# Patient Record
Sex: FEMALE | Race: WHITE | NOT HISPANIC OR LATINO | Employment: UNEMPLOYED | URBAN - METROPOLITAN AREA
[De-identification: names, ages, dates, MRNs, and addresses within clinical notes are randomized per-mention and may not be internally consistent; named-entity substitution may affect disease eponyms.]

---

## 2024-01-14 DIAGNOSIS — E06.3 HASHIMOTO'S THYROIDITIS: Primary | ICD-10-CM

## 2024-01-15 PROBLEM — E06.3 HASHIMOTO'S THYROIDITIS: Status: ACTIVE | Noted: 2024-01-15

## 2024-01-15 RX ORDER — LEVOTHYROXINE SODIUM 137 UG/1
137 TABLET ORAL DAILY
Qty: 30 TABLET | Refills: 0 | Status: SHIPPED | OUTPATIENT
Start: 2024-01-15 | End: 2024-02-16 | Stop reason: SDUPTHER

## 2024-01-18 ENCOUNTER — APPOINTMENT (OUTPATIENT)
Dept: ENDOCRINOLOGY | Facility: CLINIC | Age: 34
End: 2024-01-18
Payer: COMMERCIAL

## 2024-01-24 ENCOUNTER — TELEMEDICINE (OUTPATIENT)
Dept: ENDOCRINOLOGY | Facility: CLINIC | Age: 34
End: 2024-01-24
Payer: COMMERCIAL

## 2024-01-24 DIAGNOSIS — E31.0: ICD-10-CM

## 2024-01-24 DIAGNOSIS — E53.8 VITAMIN B12 DEFICIENCY: ICD-10-CM

## 2024-01-24 DIAGNOSIS — E28.319 PREMATURE MENOPAUSE: ICD-10-CM

## 2024-01-24 DIAGNOSIS — E06.3 HYPOTHYROIDISM DUE TO HASHIMOTO'S THYROIDITIS: ICD-10-CM

## 2024-01-24 DIAGNOSIS — E27.40 ADRENAL INSUFFICIENCY (MULTI): Primary | ICD-10-CM

## 2024-01-24 DIAGNOSIS — E55.9 VITAMIN D DEFICIENCY: ICD-10-CM

## 2024-01-24 DIAGNOSIS — E03.8 HYPOTHYROIDISM DUE TO HASHIMOTO'S THYROIDITIS: ICD-10-CM

## 2024-01-24 PROCEDURE — 99215 OFFICE O/P EST HI 40 MIN: CPT | Performed by: INTERNAL MEDICINE

## 2024-01-24 RX ORDER — HYDROCORTISONE SODIUM SUCCINATE 100 MG/2ML
100 INJECTION, POWDER, FOR SOLUTION INTRAMUSCULAR; INTRAVENOUS AS NEEDED
COMMUNITY
Start: 2021-05-04

## 2024-01-24 RX ORDER — CHOLECALCIFEROL (VITAMIN D3) 25 MCG
1 TABLET ORAL DAILY
COMMUNITY
Start: 2021-03-03

## 2024-01-24 RX ORDER — ESTRADIOL 1 MG/1
1 TABLET ORAL DAILY
COMMUNITY
Start: 2022-04-08

## 2024-01-24 RX ORDER — PREDNISONE 5 MG/1
5 TABLET ORAL
COMMUNITY
Start: 2022-11-23 | End: 2024-06-05 | Stop reason: SDUPTHER

## 2024-01-24 RX ORDER — FLUDROCORTISONE ACETATE 0.1 MG/1
0.1 TABLET ORAL DAILY
COMMUNITY
End: 2024-06-05 | Stop reason: SDUPTHER

## 2024-01-24 RX ORDER — EPINEPHRINE 0.3 MG/.3ML
0.3 INJECTION SUBCUTANEOUS ONCE AS NEEDED
COMMUNITY
Start: 2021-01-12

## 2024-01-24 RX ORDER — ALBUTEROL SULFATE 90 UG/1
2 AEROSOL, METERED RESPIRATORY (INHALATION) EVERY 6 HOURS PRN
COMMUNITY

## 2024-01-24 NOTE — PROGRESS NOTES
Consults    Reason For Consult  Polyglandular disease     History Of Present Illness  Linh Zurita is a 33 y.o. 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     Follow up visit for Hashimoto's thyroiditis, Schurz's disease, primary ovarian failure.    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     She is on multiple hormone replacement   For thyroid  LT4 137 mcg daily (which she takes HS)  -continue Vit D 1000 IU daily  -primary ovarian failure was managed by reproductive endocrinology but she had issues with supplements , and stopped     unable to tolerate OCP   -DEXA por recently fractured ankle reviewed in the system        Pt has been to ED for covid infection and her virginie's , in 9/2023 .  No adrenal crisis     Meds:  -prednisone 5 mg daily   -LT4 137mcg   -FC 0.1mg BID       Pt unable to take HC- Also having issues obtaining [employer] brand. States [employer] variety currently supplied by her pharmacy makes her feel bad.           Any family history of thyroid cancer? no  Any personal history of radiation to your head/neck? no    Past Medical History  She has a past medical history of Schurz's disease (CMS/HCC), Hashimoto's thyroiditis, Personal history of other endocrine, nutritional and metabolic disease (08/29/2017), and Vitamin D deficiency.    Surgical History  She has a past surgical history that includes Other surgical history (08/29/2017).     Social History  She reports that she has quit smoking. Her smoking use included cigarettes. She has a 1.00 pack-year smoking history. She has never used smokeless tobacco. She reports current alcohol use of about 2.0 standard drinks of alcohol per week. She reports that she does not use drugs.    Family History  Family History   Problem Relation Name Age of Onset    Coronary artery disease  Mother      COPD Mother      Coronary artery disease Father      Stroke Father      Clotting disorder Father      Thyroid disease Sister Archie     Crohn's disease Sister Archie     Diabetes Paternal Grandmother AnnaMazac         Allergies  Patient has no known allergies.    Review of Systems    Past Medical History:   Diagnosis Date    [place]'s disease (CMS/HCC)     Hashimoto's thyroiditis     Personal history of other endocrine, nutritional and metabolic disease 08/29/2017    History of hypothyroidism    Vitamin D deficiency        Past Surgical History:   Procedure Laterality Date    OTHER SURGICAL HISTORY  08/29/2017    Oral Surgery       Social History     Socioeconomic History    Marital status:      Spouse name: Not on file    Number of children: Not on file    Years of education: Not on file    Highest education level: Not on file   Occupational History    Not on file   Tobacco Use    Smoking status: Former     Packs/day: 0.50     Years: 2.00     Additional pack years: 0.00     Total pack years: 1.00     Types: Cigarettes    Smokeless tobacco: Never   Substance and Sexual Activity    Alcohol use: Yes     Alcohol/week: 2.0 standard drinks of alcohol     Types: 2 Cans of beer per week    Drug use: Never    Sexual activity: Yes     Partners: Male   Other Topics Concern    Not on file   Social History Narrative    Not on file     Social Determinants of Health     Financial Resource Strain: Not on file   Food Insecurity: Not on file   Transportation Needs: Not on file   Physical Activity: Not on file   Stress: Not on file   Social Connections: Not on file   Intimate Partner Violence: Not on file   Housing Stability: Not on file        Physical Exam     ROS, PMH, FH/SH, surgical history and allergies have been reviewed.    Last Recorded Vitals  There were no vitals taken for this visit.    Relevant Results         If you would like to pull in Medications, type .meds     If you would like to pull in Lab results for  "the last 24 hours, type .[id number]    If you would like to pull in specific Lab results, type .ll     If you would like to pull in Imaging results, type .imgrslt :99}  Lab Review  Lab Results   Component Value Date    BILITOT 0.6 11/23/2022    CALCIUM 9.6 11/23/2022    CO2 25 11/23/2022     11/23/2022    CREATININE 0.79 11/23/2022    GLUCOSE 82 11/23/2022    ALKPHOS 60 11/23/2022    K 4.1 11/23/2022    PROT 7.2 11/23/2022     11/23/2022    AST 31 11/23/2022    ALT 38 11/23/2022    BUN 9 11/23/2022    ANIONGAP 16 11/23/2022    PHOS 4.9 11/23/2022    ALBUMIN 4.4 11/23/2022    GFRF >90 11/23/2022     No results found for: \"TRIG\", \"CHOL\", \"LDLCALC\", \"HDL\"  Lab Results   Component Value Date    HGBA1C 5.0 09/07/2021     The ASCVD Risk score (Saadia HALL, et al., 2019) failed to calculate for the following reasons:    The 2019 ASCVD risk score is only valid for ages 40 to 79       Assessment/Plan   Problem List Items Addressed This Visit    None  Visit Diagnoses         Codes    Adrenal insufficiency (CMS/HCC)    -  Primary E27.40    Relevant Orders    Adrenocorticotropic Hormone (ACTH)    Cortisol    DHEA-Sulfate    FSH & LH    CBC    Vitamin D 25-Hydroxy,Total (for eval of Vitamin D levels)    Parathyroid Hormone, Intact    Magnesium    Comprehensive Metabolic Panel    Vitamin B12    Thyroxine, Free    Thyroid Stimulating Hormone    Iron and TIBC    Ferritin    Lipid Panel    Hemoglobin A1C    Hypothyroidism due to Hashimoto's thyroiditis     E03.8, E06.3    Relevant Orders    Adrenocorticotropic Hormone (ACTH)    Cortisol    DHEA-Sulfate    FSH & LH    CBC    Vitamin D 25-Hydroxy,Total (for eval of Vitamin D levels)    Parathyroid Hormone, Intact    Magnesium    Comprehensive Metabolic Panel    Vitamin B12    Thyroxine, Free    Thyroid Stimulating Hormone    Iron and TIBC    Ferritin    Lipid Panel    Hemoglobin A1C    Polyglandular autoimmune deficiency (CMS/HCC)     E31.0    Relevant Orders    " Adrenocorticotropic Hormone (ACTH)    Cortisol    DHEA-Sulfate    FSH & LH    CBC    Vitamin D 25-Hydroxy,Total (for eval of Vitamin D levels)    Parathyroid Hormone, Intact    Magnesium    Comprehensive Metabolic Panel    Vitamin B12    Thyroxine, Free    Thyroid Stimulating Hormone    Iron and TIBC    Ferritin    Lipid Panel    Hemoglobin A1C    Vitamin D deficiency     E55.9    Relevant Orders    Adrenocorticotropic Hormone (ACTH)    Cortisol    DHEA-Sulfate    FSH & LH    CBC    Vitamin D 25-Hydroxy,Total (for eval of Vitamin D levels)    Parathyroid Hormone, Intact    Magnesium    Comprehensive Metabolic Panel    Vitamin B12    Thyroxine, Free    Thyroid Stimulating Hormone    Iron and TIBC    Ferritin    Lipid Panel    Hemoglobin A1C    Vitamin B12 deficiency     E53.8    Relevant Orders    Adrenocorticotropic Hormone (ACTH)    Cortisol    DHEA-Sulfate    FSH & LH    CBC    Vitamin D 25-Hydroxy,Total (for eval of Vitamin D levels)    Parathyroid Hormone, Intact    Magnesium    Comprehensive Metabolic Panel    Vitamin B12    Thyroxine, Free    Thyroid Stimulating Hormone    Iron and TIBC    Ferritin    Lipid Panel    Hemoglobin A1C    Premature menopause     E28.319    Relevant Orders    Referral to Menopause Clinic                 · 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)   · Adult celiac disease (579.0) (K90.0)   · Hashimoto's thyroiditis (245.2) (E06.3)   · Primary ovarian failure (256.39) (E28.39)   · Vitamin D deficiency (268.9) (E55.9)  Premature menopause     PAG type 2 and vitiligo      Suboptimal control of pt's endocrinopathies largely due to non-adherence     #Hypothyroidism  -Cont LT4 137mcg PO QAM on empty stomach at least 30min prior to ingestion of any food/meds  Labs ordered      #Michael's ds  -tolerates Prednisone 5mg QAM  -on  FC from 0.1mg QAM  -Pt counseled on doubling dose in times of stress/ illness  -Has emergency injectable kit  -Because of ankle fracture in setting of long term  glucocorticoid tx will obtained DEXA, stable      #primary ovarian failure  Off meds  Needs help  Suggest menopause clinic     #PGA type 2   #fatigue  -as part of fatigue w/u will obtain CBC, B12     #Vitamin D deficiency  -Obtain RFP and Vit D  -resume Vit D supplementation     F/u in 1 year  I spent a total of 40+ minutes on the date of the service which included preparing to see the patient, face-to-face patient care, completing clinical documentation, obtaining and / or reviewing separately obtained history, counseling and educating the patient/family/caregiver, ordering medications, tests, or procedures, communicating with other healthcare providers (not separately reported), independently interpreting results, not separately reported, and communicating results to the patient/family/caregiver, real-time telemedicine visit using audiovisual technology with patient's verbal consent.  Name and date of birth verified.      Aaron Grigsby MD

## 2024-02-16 DIAGNOSIS — E06.3 HASHIMOTO'S THYROIDITIS: ICD-10-CM

## 2024-02-16 RX ORDER — LEVOTHYROXINE SODIUM 137 UG/1
137 TABLET ORAL DAILY
Qty: 30 TABLET | Refills: 0 | Status: SHIPPED | OUTPATIENT
Start: 2024-02-16 | End: 2024-02-16 | Stop reason: SDUPTHER

## 2024-02-16 RX ORDER — LEVOTHYROXINE SODIUM 137 UG/1
137 TABLET ORAL DAILY
Qty: 30 TABLET | Refills: 11 | Status: SHIPPED | OUTPATIENT
Start: 2024-02-16

## 2024-04-01 ENCOUNTER — APPOINTMENT (OUTPATIENT)
Dept: OBSTETRICS AND GYNECOLOGY | Facility: CLINIC | Age: 34
End: 2024-04-01
Payer: COMMERCIAL

## 2024-06-05 DIAGNOSIS — E27.40 ADRENAL INSUFFICIENCY (MULTI): Primary | ICD-10-CM

## 2024-06-05 RX ORDER — PREDNISONE 5 MG/1
5 TABLET ORAL
Qty: 90 TABLET | Refills: 3 | Status: SHIPPED | OUTPATIENT
Start: 2024-06-05

## 2024-06-05 RX ORDER — FLUDROCORTISONE ACETATE 0.1 MG/1
0.1 TABLET ORAL DAILY
Qty: 90 TABLET | Refills: 3 | Status: SHIPPED | OUTPATIENT
Start: 2024-06-05

## 2024-06-05 NOTE — TELEPHONE ENCOUNTER
Linh Zurita requesting refill for Prednisone and fludrocortisone . LOV 01/24/2024 Order pended to provider .  Patsy White LPN

## 2024-06-06 ENCOUNTER — PATIENT MESSAGE (OUTPATIENT)
Dept: ENDOCRINOLOGY | Facility: CLINIC | Age: 34
End: 2024-06-06
Payer: COMMERCIAL

## 2024-10-19 ENCOUNTER — OFFICE VISIT (OUTPATIENT)
Dept: URGENT CARE | Age: 34
End: 2024-10-19
Payer: COMMERCIAL

## 2024-10-19 VITALS
RESPIRATION RATE: 16 BRPM | TEMPERATURE: 98.5 F | WEIGHT: 178 LBS | BODY MASS INDEX: 30.39 KG/M2 | SYSTOLIC BLOOD PRESSURE: 109 MMHG | OXYGEN SATURATION: 97 % | DIASTOLIC BLOOD PRESSURE: 75 MMHG | HEART RATE: 73 BPM | HEIGHT: 64 IN

## 2024-10-19 DIAGNOSIS — H10.12 ACUTE ALLERGIC CONJUNCTIVITIS OF LEFT EYE: Primary | ICD-10-CM

## 2024-10-19 PROCEDURE — 3008F BODY MASS INDEX DOCD: CPT | Performed by: STUDENT IN AN ORGANIZED HEALTH CARE EDUCATION/TRAINING PROGRAM

## 2024-10-19 PROCEDURE — 99203 OFFICE O/P NEW LOW 30 MIN: CPT | Performed by: STUDENT IN AN ORGANIZED HEALTH CARE EDUCATION/TRAINING PROGRAM

## 2024-10-19 RX ORDER — AZELASTINE HYDROCHLORIDE 0.5 MG/ML
1 SOLUTION/ DROPS OPHTHALMIC 2 TIMES DAILY
Qty: 6 ML | Refills: 0 | Status: SHIPPED | OUTPATIENT
Start: 2024-10-19 | End: 2024-10-26

## 2024-10-19 RX ORDER — CITALOPRAM HYDROBROMIDE 10 MG/5ML
10 SOLUTION ORAL DAILY
COMMUNITY

## 2024-10-19 ASSESSMENT — PAIN SCALES - GENERAL: PAINLEVEL_OUTOF10: 3

## 2024-10-19 NOTE — PROGRESS NOTES
"Subjective   Patient ID: Linh Zurita is a 34 y.o. female. They present today with a chief complaint of Eye Problem ( Per Pt. Lt eye tearing and burning; itching since yesterday; also cough for about one month.).    History of Present Illness  Patient reports ~1 day of left eye itching and burning  Notes that she woke up with some watery tears along the side of her face  No reported known trauma to the eye  Notes hx of photophobia and HA  No reported vision loss  Patient reports initial concern for pink eye    Past Medical History  Allergies as of 10/19/2024    (No Known Allergies)       (Not in a hospital admission)       Past Medical History:   Diagnosis Date    Michael's disease (Multi)     Hashimoto's thyroiditis     Personal history of other endocrine, nutritional and metabolic disease 08/29/2017    History of hypothyroidism    Vitamin D deficiency        Past Surgical History:   Procedure Laterality Date    OTHER SURGICAL HISTORY  08/29/2017    Oral Surgery        reports that she has quit smoking. Her smoking use included cigarettes. She has a 1 pack-year smoking history. She does not have any smokeless tobacco history on file. Alcohol use questions deferred to the physician. Drug use questions deferred to the physician.                               Objective    Vitals:    10/19/24 1834   BP: 109/75   Pulse: 73   Resp: 16   Temp: 36.9 °C (98.5 °F)   TempSrc: Oral   SpO2: 97%   Weight: 80.7 kg (178 lb)   Height: 1.626 m (5' 4\")     No LMP recorded.    Physical Exam  Constitutional:       General: She is not in acute distress.     Appearance: Normal appearance. She is not toxic-appearing or diaphoretic.   HENT:      Nose: No rhinorrhea.   Eyes:      General: No scleral icterus.        Right eye: No discharge.         Left eye: No discharge.      Extraocular Movements: Extraocular movements intact.      Right eye: Normal extraocular motion and no nystagmus.      Left eye: Normal extraocular motion and no " nystagmus.      Conjunctiva/sclera:      Right eye: No exudate or hemorrhage.     Left eye: No exudate or hemorrhage.     Comments: Palpebral conjunctiva somewhat pale along the left eye    PERRL   Pulmonary:      Effort: Pulmonary effort is normal.   Musculoskeletal:      Cervical back: Normal range of motion.   Skin:     Comments: Areas of hypopigmentation    Neurological:      Mental Status: She is alert.   Psychiatric:         Mood and Affect: Mood normal.         Behavior: Behavior normal.         Thought Content: Thought content normal.      Comments: Pleasant         Procedures    Point of Care Test & Imaging Results from this visit:      Diagnostic study results (if any) were reviewed by Bharat Cuevas MD.    Assessment/Plan   Allergies, medications, history, and pertinent labs/EKGs/Imaging reviewed by Bharat Cuevas MD.     Medical Decision Making:      Orders and Diagnoses  Diagnoses and all orders for this visit:  Acute allergic conjunctivitis of left eye  -     azelastine (Optivar) 0.05 % ophthalmic solution; Administer 1 drop into the left eye 2 times a day for 7 days.      Patient disposition: Home      Medical Admin Record      Follow Up Instructions  No follow-ups on file.    Electronically signed by Bharat Cuevas MD  12:43 PM

## 2025-01-28 ENCOUNTER — APPOINTMENT (OUTPATIENT)
Dept: ENDOCRINOLOGY | Facility: CLINIC | Age: 35
End: 2025-01-28
Payer: COMMERCIAL